# Patient Record
Sex: FEMALE | NOT HISPANIC OR LATINO | Employment: OTHER | URBAN - NONMETROPOLITAN AREA
[De-identification: names, ages, dates, MRNs, and addresses within clinical notes are randomized per-mention and may not be internally consistent; named-entity substitution may affect disease eponyms.]

---

## 2024-03-14 PROBLEM — G45.9 TIA (TRANSIENT ISCHEMIC ATTACK): Status: ACTIVE | Noted: 2024-03-14

## 2024-03-14 PROBLEM — I10 ESSENTIAL HYPERTENSION, BENIGN: Status: ACTIVE | Noted: 2024-03-14

## 2024-03-14 PROBLEM — R00.2 PALPITATION: Status: ACTIVE | Noted: 2024-03-14

## 2024-03-14 PROBLEM — R42 DIZZINESS: Status: ACTIVE | Noted: 2024-03-14

## 2024-03-14 PROBLEM — R07.9 CHEST PAIN: Status: ACTIVE | Noted: 2024-03-14

## 2024-03-14 PROBLEM — R06.02 SHORTNESS OF BREATH: Status: ACTIVE | Noted: 2024-03-14

## 2024-03-14 PROBLEM — I48.0 PAROXYSMAL ATRIAL FIBRILLATION (MULTI): Status: ACTIVE | Noted: 2024-03-14

## 2024-03-14 RX ORDER — MULTIVITAMIN
1 TABLET ORAL DAILY
COMMUNITY

## 2024-03-14 RX ORDER — FAMOTIDINE 40 MG/1
1 TABLET, FILM COATED ORAL NIGHTLY
COMMUNITY

## 2024-03-14 RX ORDER — SERTRALINE HYDROCHLORIDE 25 MG/1
1 TABLET, FILM COATED ORAL DAILY
COMMUNITY

## 2024-03-14 RX ORDER — ASPIRIN 81 MG/1
1 TABLET ORAL DAILY
COMMUNITY

## 2024-03-14 RX ORDER — FENUGREEK SEED/BL.THISTLE/ANIS 340 MG
CAPSULE ORAL
COMMUNITY

## 2024-03-14 RX ORDER — GINSENG 100 MG
1 CAPSULE ORAL DAILY
COMMUNITY

## 2024-03-14 RX ORDER — FLUTICASONE PROPIONATE 50 MCG
1 SPRAY, SUSPENSION (ML) NASAL DAILY
COMMUNITY

## 2024-03-14 RX ORDER — POLYETHYLENE GLYCOL 400 AND PROPYLENE GLYCOL 4; 3 MG/ML; MG/ML
SOLUTION/ DROPS OPHTHALMIC
COMMUNITY

## 2024-06-25 ENCOUNTER — APPOINTMENT (OUTPATIENT)
Dept: CARDIOLOGY | Facility: CLINIC | Age: 89
End: 2024-06-25
Payer: MEDICARE

## 2024-07-15 ENCOUNTER — APPOINTMENT (OUTPATIENT)
Dept: CARDIOLOGY | Facility: CLINIC | Age: 89
End: 2024-07-15
Payer: MEDICARE

## 2024-07-15 VITALS
BODY MASS INDEX: 19.93 KG/M2 | HEIGHT: 67 IN | SYSTOLIC BLOOD PRESSURE: 110 MMHG | WEIGHT: 127 LBS | HEART RATE: 60 BPM | DIASTOLIC BLOOD PRESSURE: 72 MMHG

## 2024-07-15 DIAGNOSIS — I48.0 PAROXYSMAL ATRIAL FIBRILLATION (MULTI): ICD-10-CM

## 2024-07-15 DIAGNOSIS — R42 DIZZINESS: ICD-10-CM

## 2024-07-15 DIAGNOSIS — I10 ESSENTIAL HYPERTENSION, BENIGN: Primary | ICD-10-CM

## 2024-07-15 DIAGNOSIS — R00.2 PALPITATION: ICD-10-CM

## 2024-07-15 DIAGNOSIS — Z78.9 NEVER SMOKED TOBACCO: ICD-10-CM

## 2024-07-15 DIAGNOSIS — R06.02 SHORTNESS OF BREATH: ICD-10-CM

## 2024-07-15 PROCEDURE — 99213 OFFICE O/P EST LOW 20 MIN: CPT | Performed by: INTERNAL MEDICINE

## 2024-07-15 PROCEDURE — 1159F MED LIST DOCD IN RCRD: CPT | Performed by: INTERNAL MEDICINE

## 2024-07-15 PROCEDURE — 3078F DIAST BP <80 MM HG: CPT | Performed by: INTERNAL MEDICINE

## 2024-07-15 PROCEDURE — 1036F TOBACCO NON-USER: CPT | Performed by: INTERNAL MEDICINE

## 2024-07-15 PROCEDURE — 3074F SYST BP LT 130 MM HG: CPT | Performed by: INTERNAL MEDICINE

## 2024-07-15 PROCEDURE — 1160F RVW MEDS BY RX/DR IN RCRD: CPT | Performed by: INTERNAL MEDICINE

## 2024-07-15 RX ORDER — UBIDECARENONE 75 MG
500 CAPSULE ORAL DAILY
COMMUNITY

## 2024-07-15 RX ORDER — CHOLECALCIFEROL (VITAMIN D3) 25 MCG
1000 TABLET ORAL DAILY
COMMUNITY

## 2024-07-15 RX ORDER — CALCIUM CARBONATE 300MG(750)
400 TABLET,CHEWABLE ORAL DAILY
COMMUNITY

## 2024-07-15 RX ORDER — ACETAMINOPHEN 500 MG
500 TABLET ORAL EVERY 6 HOURS PRN
COMMUNITY

## 2024-07-15 RX ORDER — LORATADINE 10 MG/1
10 TABLET ORAL DAILY
COMMUNITY

## 2024-07-15 NOTE — PROGRESS NOTES
"Subjective   Celeste Bishop is a 89 y.o. female       Chief Complaint    Follow-up          HPI         Patient is here for follow-up to management for previous evaluation for hypertension, palpitation and chronic dizziness.  Since last time I saw her she was in the hospital after she fell and broke her hip.  She continues to complain of intermittent dizziness.  According to her on occasion it can happen when she is laying down in bed and sometimes when she is standing up.  We felt that some of the symptoms may be related to previous history of stroke and some of it also is due to some eye problem.  Apparently she has double vision and she using certain type of lances that mask vision-1 I to allow the use of 1 eye at a time to minimize the double vision and the dizziness.    Assessment     1. Hypertension controlled with dietary modification.  She is not on any treatment  2. Palpitation with remote history of atrial fibrillation no recurrence she elected for aspirin therapy  3. Continued chronic dizziness I suspect primarily neurologic or eye problems does not appear orthostatic hypotension     Plan     1.  Patient was advised to continue her long-term follow-up with her eye doctor and neurologist we will continue with observant approach from the cardiovascular standpoint  2.  Will follow on as-needed basis  Review of Systems   All other systems reviewed and are negative.           Vitals:    07/15/24 0931   BP: 110/72   BP Location: Left arm   Patient Position: Sitting   Pulse: 60   Weight: 57.6 kg (127 lb)   Height: 1.702 m (5' 7\")        Objective   Physical Exam  Constitutional:       Appearance: Normal appearance.   HENT:      Nose: Nose normal.   Neck:      Vascular: No carotid bruit.   Cardiovascular:      Rate and Rhythm: Normal rate.      Pulses: Normal pulses.      Heart sounds: Normal heart sounds.   Pulmonary:      Effort: Pulmonary effort is normal.   Abdominal:      General: Bowel sounds are normal. "      Palpations: Abdomen is soft.   Musculoskeletal:         General: Normal range of motion.      Cervical back: Normal range of motion.      Right lower leg: No edema.      Left lower leg: No edema.   Skin:     General: Skin is warm and dry.   Neurological:      General: No focal deficit present.      Mental Status: She is alert.   Psychiatric:         Mood and Affect: Mood normal.         Behavior: Behavior normal.         Thought Content: Thought content normal.         Judgment: Judgment normal.         Allergies  Sulfamethoxazole and Neomycin-polymyxin-gramicidin     Current Medications    Current Outpatient Medications:     acetaminophen (Tylenol) 500 mg tablet, Take 1 tablet (500 mg) by mouth every 6 hours if needed., Disp: , Rfl:     aspirin 81 mg EC tablet, Take 1 tablet (81 mg) by mouth once daily., Disp: , Rfl:     cholecalciferol (Vitamin D3) 25 MCG (1000 UT) tablet, Take 1 tablet (1,000 Units) by mouth once daily., Disp: , Rfl:     cyanocobalamin (Vitamin B-12) 500 mcg tablet, Take 1 tablet (500 mcg) by mouth once daily., Disp: , Rfl:     d-mannose 500 mg capsule, Take by mouth once daily., Disp: , Rfl:     DENOSUMAB SUBQ, Inject under the skin., Disp: , Rfl:     famotidine (Pepcid) 40 mg tablet, Take 1 tablet (40 mg) by mouth once daily at bedtime., Disp: , Rfl:     fluticasone (Flonase Allergy Relief) 50 mcg/actuation nasal spray, Administer 1 spray into affected nostril(s) once daily., Disp: , Rfl:     Lactobac 42-Bifid 3-ldyrmt-PBR (Probiotic Plus Colostrum) -50 mg powder in packet, Take by mouth., Disp: , Rfl:     loratadine (Claritin) 10 mg tablet, Take 1 tablet (10 mg) by mouth once daily., Disp: , Rfl:     magnesium oxide (Mag-Ox) 400 mg tablet, Take 1 tablet (400 mg) by mouth once daily., Disp: , Rfl:     multivitamin (Daily Multi-Vitamin) tablet, Take 1 tablet by mouth once daily., Disp: , Rfl:     peg 400-propylene glycol (Systane, propylene glycoL,) 0.4-0.3 % drops ophthalmic drops,  Administer into affected eye(s)., Disp: , Rfl:     sertraline (Zoloft) 25 mg tablet, Take 1 tablet (25 mg) by mouth once daily., Disp: , Rfl:                      Assessment/Plan   1. Essential hypertension, benign        2. Palpitation        3. Paroxysmal atrial fibrillation (Multi)        4. Shortness of breath        5. Dizziness        6. Body mass index (BMI) of 19.0-19.9 in adult        7. Never smoked tobacco                 Scribe Attestation  By signing my name below, Rhea WARD LPN, Scribe   attest that this documentation has been prepared under the direction and in the presence of Quinn Klein MD.     Provider Attestation - Scribe documentation    All medical record entries made by the Scribe were at my direction and personally dictated by me. I have reviewed the chart and agree that the record accurately reflects my personal performance of the history, physical exam, discussion and plan.

## 2024-07-15 NOTE — PATIENT INSTRUCTIONS
Please bring all medicines, vitamins, and herbal supplements with you when you come to the office.    Prescriptions will not be filled unless you are compliant with your follow up appointments or have a follow up appointment scheduled as per instruction of your physician. Refills should be requested at the time of your visit.     Follow up as needed

## 2024-07-21 LAB
NON-UH HIE BILIRUBIN:PRTHR:PT:URINE:ORD:TEST STRIP.AUTOMATED: NEGATIVE MG/DL
NON-UH HIE CLARITY:TYPE:PT:URINE:NOM:: CLEAR
NON-UH HIE CLASS:TYPE:PT:URINE COLLECTION METHOD:NOM:*: NORMAL
NON-UH HIE COLOR:TYPE:PT:URINE:NOM:AUTO: NORMAL
NON-UH HIE GLUCOSE:PRTHR:PT:URINE:ORD:TEST STRIP: NEGATIVE MG/DL
NON-UH HIE HEMOGLOBIN:MCNC:PT:URINE:SEMIQN:TEST STRIP.AUTOMATED: NEGATIVE MG/DL
NON-UH HIE KETONES:PRTHR:PT:URINE:ORD:TEST STRIP.AUTOMATED: NEGATIVE MG/DL
NON-UH HIE LEUKOCYTE ESTERASE:PRTHR:PT:URINE:ORD:TEST STRIP.AUTOMATED: NEGATIVE CD:4673125267
NON-UH HIE NITRITE:PRTHR:PT:URINE:ORD:TEST STRIP.AUTOMATED: NEGATIVE MG/DL
NON-UH HIE PH:LSCNC:PT:URINE:QN:TEST STRIP: 7 (ref 5–9)
NON-UH HIE PROTEIN:PRTHR:PT:URINE:ORD:TEST STRIP: NEGATIVE MG/DL
NON-UH HIE SPECIFIC GRAVITY:RDEN:PT:URINE:QN:TEST STRIP: 1.01 (ref 1–1.03)
NON-UH HIE UROBILINOGEN:MCNC:PT:URINE:SEMIQN:TEST STRIP: NEGATIVE MG/DL